# Patient Record
Sex: FEMALE | Race: BLACK OR AFRICAN AMERICAN | NOT HISPANIC OR LATINO | Employment: UNEMPLOYED | URBAN - METROPOLITAN AREA
[De-identification: names, ages, dates, MRNs, and addresses within clinical notes are randomized per-mention and may not be internally consistent; named-entity substitution may affect disease eponyms.]

---

## 2018-02-16 ENCOUNTER — HISTORICAL (OUTPATIENT)
Dept: ADMINISTRATIVE | Facility: HOSPITAL | Age: 53
End: 2018-02-16

## 2018-02-16 LAB
ABS NEUT (OLG): 3.64 X10(3)/MCL (ref 2.1–9.2)
ALBUMIN SERPL-MCNC: 3.5 GM/DL (ref 3.4–5)
ALBUMIN/GLOB SERPL: 1 RATIO (ref 1–2)
ALP SERPL-CCNC: 93 UNIT/L (ref 45–117)
ALT SERPL-CCNC: 17 UNIT/L (ref 12–78)
AST SERPL-CCNC: 11 UNIT/L (ref 15–37)
BASOPHILS # BLD AUTO: 0.02 X10(3)/MCL
BASOPHILS NFR BLD AUTO: 0 % (ref 0–1)
BILIRUB SERPL-MCNC: 0.3 MG/DL (ref 0.2–1)
BILIRUBIN DIRECT+TOT PNL SERPL-MCNC: 0.1 MG/DL
BILIRUBIN DIRECT+TOT PNL SERPL-MCNC: 0.2 MG/DL
BUN SERPL-MCNC: 12 MG/DL (ref 7–18)
CALCIUM SERPL-MCNC: 8.7 MG/DL (ref 8.5–10.1)
CHLORIDE SERPL-SCNC: 107 MMOL/L (ref 98–107)
CO2 SERPL-SCNC: 29 MMOL/L (ref 21–32)
CREAT SERPL-MCNC: 0.8 MG/DL (ref 0.6–1.3)
EOSINOPHIL # BLD AUTO: 0.19 X10(3)/MCL
EOSINOPHIL NFR BLD AUTO: 3 % (ref 0–5)
ERYTHROCYTE [DISTWIDTH] IN BLOOD BY AUTOMATED COUNT: 14.5 % (ref 11.5–14.5)
GLOBULIN SER-MCNC: 4.6 GM/ML (ref 2.3–3.5)
GLUCOSE SERPL-MCNC: 96 MG/DL (ref 74–106)
HCT VFR BLD AUTO: 40.9 % (ref 35–46)
HGB BLD-MCNC: 13.2 GM/DL (ref 12–16)
IMM GRANULOCYTES # BLD AUTO: 0.03 10*3/UL
IMM GRANULOCYTES NFR BLD AUTO: 0 %
LYMPHOCYTES # BLD AUTO: 2.9 X10(3)/MCL
LYMPHOCYTES NFR BLD AUTO: 39 % (ref 15–40)
MCH RBC QN AUTO: 25.8 PG (ref 26–34)
MCHC RBC AUTO-ENTMCNC: 32.3 GM/DL (ref 31–37)
MCV RBC AUTO: 79.9 FL (ref 80–100)
MONOCYTES # BLD AUTO: 0.58 X10(3)/MCL
MONOCYTES NFR BLD AUTO: 8 % (ref 4–12)
NEUTROPHILS # BLD AUTO: 3.64 X10(3)/MCL
NEUTROPHILS NFR BLD AUTO: 49 X10(3)/MCL
PLATELET # BLD AUTO: 315 X10(3)/MCL (ref 130–400)
PMV BLD AUTO: 9.8 FL (ref 7.4–10.4)
POTASSIUM SERPL-SCNC: 3.5 MMOL/L (ref 3.5–5.1)
PROT SERPL-MCNC: 8.1 GM/DL (ref 6.4–8.2)
RBC # BLD AUTO: 5.12 X10(6)/MCL (ref 4–5.2)
SODIUM SERPL-SCNC: 140 MMOL/L (ref 136–145)
WBC # SPEC AUTO: 7.4 X10(3)/MCL (ref 4.5–11)

## 2022-04-30 NOTE — PROGRESS NOTES
Patient:   Christie Jha             MRN: [id number]            FIN: [id number]               Age:   52 years     Sex:  Female     :  1965   Associated Diagnoses:   None   Author:   Celia Pedroza      Visit Information   Dx & staging      Chief Complaint         Problem List:  Rectal polyp 1.2 cm, with 0.87 m low-grade carcinoid, positive margin   - 2015, colonoscopy by Dr. Sesay   - No evidence of systemic disease by 5 HIAA on urine, chromogranin A, CT scans, octreotide scan with subtle questionable finding without verification  History of additional tubular adenomatous    Current Treatment:  Surveillance - colonoscopy reported 2016    Treatment History:  none     Clinical History/HPI:  This is a 50 yo AAM presents as f/u for rectal neuroendocrine tumor. She was being followed by Dr. Sesay in his clinic. She was initially seen by him in 08 complaining of rectal bleeding, abdominal pain, mucus in stool and change in stool caliber. She had initial colonoscopy on 2008 with unknown results. A repeat colonoscopy was done on 3/26/2013 showing single polyp in proximal descending colon and single polyp in rectum.  Biopsy showed tubular adenoma with no high grade dysplasia or malignancy of the descending colon polyp. A follow-up colonoscopy on 5/27/15 showed a rectal polyp (5 mm) with biopsy showing low grade neuroendocrine carcinoid tumor present at resection margin with largest microscopic dimension of 0.8 cm with involvement of submucosa and mucosa. Tumor is strongly positive for synaptophysin and negative for chromogranin.  Patient has family history of colon CA in her grandfather and lung CA in her mother.     5/27/15 cscope path report: Rectum polyp, low-grade neuroendocrine (carcinoid) tumor, present at resection margin, largest microscopic dimension 0.8 cm, mucosa/submucosa involvement, strongly positive for synaptophyin and neg for chromagrannin.    Chromogranin level  on 7/2/15 was  48.     On 7/13/15: CT A/P: IMPRESSION: Normal postcholecystectomy CT abdomen pelvis. Thoracolumbar scoliosis.    NM whole body scan on 7/21/15: IMPRESSION: 1. Subtle by liver findings-SPECT reconstruction artifact versus subtle lesion. 2. Consider MRI Abdomen with and without contrast to further assess.     MRI 7/31/15: IMPRESSION: Limited exam due to motion artifact. Otherwise, grossly unremarkable MR evaluation of the abdomen.     7/31/15 24-hr 5-HIAA: 4      Interval History     2/16/17: Obese young female with a history as above, found on colonoscopy screening to have a rectal polyp involved by low-grade carcinoid; I noted taht it had a positive margin.  I did obtain the original pathology report to review.  She has some chronic loose stools that dates back to cholecystectomy, and has no flushing, no unintentional weight loss, would like to lose weight, no chronic abdominal symptoms.  Although I do not have records, she has had a follow-up colonoscopy in April 2016; I'm unsure of results; there is plans for a one-year follow-up, presumably in the next few months.  2/16/18: Patient presents for scheduled follow-up. It's been 1 year since her last visit. She denies having any change in bowel habits, no constipation, no diarrhea. She's had no unintentional weight loss, no fever, chills, night sweats, no chronic abdominal pains, shortness of breath or cough. She does annual colonoscopies with Dr. Sesay in [place], last done was in April 2017. Will request these records. Patient reports there were no abnormal findings.      Review of Systems   A complete 12 point ROS was performed in full with pertinent positives as described in interval history. Remainder of ROS done in full and are negative.      Health Status   Allergies:    Allergic Reactions (Selected)  Mild  Iodine- Rash.  High  Amoxicillin- Anaphylaxis.,    Allergies (2) Active Reaction  amoxicillin Anaphylaxis  iodine rash     Current  medications:  (Selected)   Prescriptions  Prescribed  Vitamin D3 50,000 intl units oral capsule: 50,000 IntUnit = 1 cap(s), Oral, qWeek, # 9 cap(s), 0 Refill(s), Pharmacy: [facility]  Documented Medications  Documented  AMLODIPINE   TAB 5M mg = 1 tab(s), Oral, Daily  garlic oral capsule: 0 Refill(s)  hydrochlorothiazide 25 mg oral tablet: 25 mg = 1 tab(s), Oral, Daily, # 30 tab(s), 0 Refill(s)  lisinopril 20 mg oral tablet: 0 Refill(s)  omeprazole 20 mg oral DR capsule: 0 Refill(s)   Problem list:    All Problems  FH: Hypertension / SNOMED CT 776893202 / Confirmed  Morbid obesity / SNOMED CT 613391342 / Probable  Neuroendocrine carcinoma / SNOMED CT 255339257 / Confirmed,    Active Problems (3)  FH: Hypertension   Morbid obesity   Neuroendocrine carcinoma         Histories   Past Medical History:    No active or resolved past medical history items have been selected or recorded., HTN   Family History:    Primary malignant neoplasm of lung  Mother (Yocasta Kern, )  , mother diagnosed with lung CA at age 68, grandfather with colon CA    Procedure history:    FH: cholecystectomy ([id number]) on 2014 at 48 Years.  hysterectomy on 2010 at 44 Years.   Social History        Social & Psychosocial Habits    Alcohol  2015 Risk Assessment: Denies Alcohol Use    2016  Use: Never    Employment/School  2015 Risk Assessment: Not employed or in school    2016  Status: Unemployed    Home/Environment  2016  Lives with: Spouse    Substance Abuse  2015 Risk Assessment: Denies Substance Abuse    2016  Use: Never    Tobacco  2015 Risk Assessment: Denies Tobacco Use    2016  Use: Never smoker  .     denies tobacco use, occasional EtOH use .        Physical Examination   Vital Signs   2018 12:53 CST      Temperature Oral          36.6 DegC                             Temperature Oral (calculated)             97.88 DegF                              Peripheral Pulse Rate     63 bpm                             Respiratory Rate          18 br/min                             Systolic Blood Pressure   137 mmHg                             Diastolic Blood Pressure  88 mmHg     General:  Alert and oriented, No acute distress.    Eye:  Pupils are equal, round and reactive to light, Extraocular movements are intact.    HENT:  Normocephalic.    Neck:  Supple, Non-tender.    Respiratory:  Lungs are clear to auscultation, Respirations are non-labored, Breath sounds are equal, Symmetrical chest wall expansion.    Cardiovascular:  Normal rate, Regular rhythm, No murmur, No gallop, No edema.    Gastrointestinal:  Soft, Non-tender, Normal bowel sounds, No obvious organomegaly.         Abdomen: Obese.    Genitourinary:  No costovertebral angle tenderness.    Musculoskeletal:  Normal range of motion, No deformity, Normal gait.    Integumentary:  Warm, Dry, Intact.    Neurologic:  Alert, Oriented, Cranial Nerves II-XII are grossly intact.    Cognition and Speech:  Speech clear and coherent, Functional cognition intact.    Psychiatric:  Cooperative, Appropriate mood & affect, Normal judgment.    The [employer] Toxicity Scores:   Toxicity Grading   2/15/2018 12:47 CST      Phone Message/Call        Oncology clinic appointment    2/15/2018 8:45 CST       Phone Message/Call        General Message  (Modified)    ECOG Performance Scale: 1- Strenuous physical activity restricted; fully ambulatory and able to carry out light work.      Review / Management   Most recent CT scan from 9/6/2016 reviewed, with hepatomegaly, no focal lesions, status post cholecystectomy, no definite hepatic steatosis texture changes, no adenopathy or other findings      Impression and Plan     Impression:  Rectal polyp with carcinoid tumor of rectum- diagnosed on 5/27/15   - Technically T1 NX   - Positive margin   - Probably low risk of systemic disease   - Difficult  to define risk of local recurrence;  initial endoscopic reexcision of the area could have been considered at present time, although not likely feasible now  Hepatomegaly, significant obesity, LFTs normal  Chronic microcytosis with normal hemoglobin, elevated RBC, likely consistent with thalassemia trait (not discussed with patient, per Dr. Jim)    Discussion Per Dr. Jim 2/2017:  Role of medical oncology in patient's follow-up is unclear;  I believe is very important have continued colonoscopy or a flexible sigmoidoscopy to monitor and surveillance of the rectal for any local progression.    Plan  Continue GI follow-up with surveillance with attention to the rectum, per Dr. Sesay in [place].  Request GI notes and colonoscopy reports from Dr. Sesay.  General visit with oncology in one year, sooner if desired by physicians or patient  Consider increase activity, healthy lifestyle choices, goals of weight loss

## 2022-10-14 ENCOUNTER — OFFICE VISIT (OUTPATIENT)
Dept: ENDOCRINOLOGY | Facility: CLINIC | Age: 57
End: 2022-10-14
Payer: MEDICAID

## 2022-10-14 VITALS
SYSTOLIC BLOOD PRESSURE: 131 MMHG | HEIGHT: 63 IN | TEMPERATURE: 99 F | BODY MASS INDEX: 44.45 KG/M2 | HEART RATE: 53 BPM | DIASTOLIC BLOOD PRESSURE: 86 MMHG | WEIGHT: 250.88 LBS | RESPIRATION RATE: 20 BRPM

## 2022-10-14 DIAGNOSIS — E11.9 TYPE 2 DIABETES MELLITUS WITHOUT COMPLICATION, WITHOUT LONG-TERM CURRENT USE OF INSULIN: Primary | ICD-10-CM

## 2022-10-14 DIAGNOSIS — D3A.8 NEUROENDOCRINE TUMOR OF ANUS: ICD-10-CM

## 2022-10-14 PROBLEM — M19.90 DJD (DEGENERATIVE JOINT DISEASE): Status: ACTIVE | Noted: 2019-09-17

## 2022-10-14 PROBLEM — M25.519 SHOULDER PAIN: Status: ACTIVE | Noted: 2019-09-17

## 2022-10-14 PROBLEM — M41.9 SCOLIOSIS: Status: ACTIVE | Noted: 2019-09-17

## 2022-10-14 PROBLEM — E66.9 OBESITY: Status: ACTIVE | Noted: 2019-09-17

## 2022-10-14 PROBLEM — N20.0 KIDNEY CALCULUS: Status: ACTIVE | Noted: 2019-09-17

## 2022-10-14 PROBLEM — I10 HTN (HYPERTENSION): Status: ACTIVE | Noted: 2019-09-17

## 2022-10-14 PROBLEM — N62 MACROMASTIA: Status: ACTIVE | Noted: 2019-09-19

## 2022-10-14 PROBLEM — J32.9 SINUSITIS: Status: ACTIVE | Noted: 2019-09-17

## 2022-10-14 PROBLEM — C7A.8 NEUROENDOCRINE CARCINOMA: Status: ACTIVE | Noted: 2022-10-14

## 2022-10-14 PROBLEM — K21.9 GERD (GASTROESOPHAGEAL REFLUX DISEASE): Status: ACTIVE | Noted: 2019-09-17

## 2022-10-14 PROBLEM — M75.100 ROTATOR CUFF TEAR: Status: ACTIVE | Noted: 2019-09-17

## 2022-10-14 PROBLEM — M77.30 CALCANEAL SPUR: Status: ACTIVE | Noted: 2019-09-17

## 2022-10-14 LAB
ALBUMIN SERPL-MCNC: 4.1 GM/DL (ref 3.5–5)
ALBUMIN/GLOB SERPL: 1.1 RATIO (ref 1.1–2)
ALP SERPL-CCNC: 90 UNIT/L (ref 40–150)
ALT SERPL-CCNC: 11 UNIT/L (ref 0–55)
AST SERPL-CCNC: 11 UNIT/L (ref 5–34)
BILIRUBIN DIRECT+TOT PNL SERPL-MCNC: 0.4 MG/DL
BUN SERPL-MCNC: 12.5 MG/DL (ref 9.8–20.1)
CALCIUM SERPL-MCNC: 9.6 MG/DL (ref 8.4–10.2)
CHLORIDE SERPL-SCNC: 103 MMOL/L (ref 98–107)
CHOLEST SERPL-MCNC: 205 MG/DL
CHOLEST/HDLC SERPL: 4 {RATIO} (ref 0–5)
CO2 SERPL-SCNC: 28 MMOL/L (ref 22–29)
CREAT SERPL-MCNC: 0.74 MG/DL (ref 0.55–1.02)
CREAT UR-MCNC: 87.6 MG/DL (ref 47–110)
GFR SERPLBLD CREATININE-BSD FMLA CKD-EPI: >60 MLS/MIN/1.73/M2
GLOBULIN SER-MCNC: 3.9 GM/DL (ref 2.4–3.5)
GLUCOSE SERPL-MCNC: 83 MG/DL (ref 74–100)
HBA1C MFR BLD: 6 %
HDLC SERPL-MCNC: 58 MG/DL (ref 35–60)
LDLC SERPL CALC-MCNC: 131 MG/DL (ref 50–140)
MICROALBUMIN UR-MCNC: 10 UG/ML
MICROALBUMIN/CREAT RATIO PNL UR: 11.4 MG/GM CR (ref 0–30)
POTASSIUM SERPL-SCNC: 3.7 MMOL/L (ref 3.5–5.1)
PROT SERPL-MCNC: 8 GM/DL (ref 6.4–8.3)
SODIUM SERPL-SCNC: 141 MMOL/L (ref 136–145)
TRIGL SERPL-MCNC: 80 MG/DL (ref 37–140)
VLDLC SERPL CALC-MCNC: 16 MG/DL

## 2022-10-14 PROCEDURE — 3079F DIAST BP 80-89 MM HG: CPT | Mod: CPTII,,, | Performed by: NURSE PRACTITIONER

## 2022-10-14 PROCEDURE — 36415 COLL VENOUS BLD VENIPUNCTURE: CPT | Performed by: NURSE PRACTITIONER

## 2022-10-14 PROCEDURE — 82043 UR ALBUMIN QUANTITATIVE: CPT | Performed by: NURSE PRACTITIONER

## 2022-10-14 PROCEDURE — 1159F MED LIST DOCD IN RCRD: CPT | Mod: CPTII,,, | Performed by: NURSE PRACTITIONER

## 2022-10-14 PROCEDURE — 80061 LIPID PANEL: CPT | Performed by: NURSE PRACTITIONER

## 2022-10-14 PROCEDURE — 99214 OFFICE O/P EST MOD 30 MIN: CPT | Mod: PBBFAC | Performed by: NURSE PRACTITIONER

## 2022-10-14 PROCEDURE — 1159F PR MEDICATION LIST DOCUMENTED IN MEDICAL RECORD: ICD-10-PCS | Mod: CPTII,,, | Performed by: NURSE PRACTITIONER

## 2022-10-14 PROCEDURE — 83036 HEMOGLOBIN GLYCOSYLATED A1C: CPT | Mod: PBBFAC | Performed by: NURSE PRACTITIONER

## 2022-10-14 PROCEDURE — 99204 OFFICE O/P NEW MOD 45 MIN: CPT | Mod: S$PBB,,, | Performed by: NURSE PRACTITIONER

## 2022-10-14 PROCEDURE — 84681 ASSAY OF C-PEPTIDE: CPT | Performed by: NURSE PRACTITIONER

## 2022-10-14 PROCEDURE — 1160F PR REVIEW ALL MEDS BY PRESCRIBER/CLIN PHARMACIST DOCUMENTED: ICD-10-PCS | Mod: CPTII,,, | Performed by: NURSE PRACTITIONER

## 2022-10-14 PROCEDURE — 3044F HG A1C LEVEL LT 7.0%: CPT | Mod: CPTII,,, | Performed by: NURSE PRACTITIONER

## 2022-10-14 PROCEDURE — 3075F PR MOST RECENT SYSTOLIC BLOOD PRESS GE 130-139MM HG: ICD-10-PCS | Mod: CPTII,,, | Performed by: NURSE PRACTITIONER

## 2022-10-14 PROCEDURE — 3044F PR MOST RECENT HEMOGLOBIN A1C LEVEL <7.0%: ICD-10-PCS | Mod: CPTII,,, | Performed by: NURSE PRACTITIONER

## 2022-10-14 PROCEDURE — 80053 COMPREHEN METABOLIC PANEL: CPT | Performed by: NURSE PRACTITIONER

## 2022-10-14 PROCEDURE — 99204 PR OFFICE/OUTPT VISIT, NEW, LEVL IV, 45-59 MIN: ICD-10-PCS | Mod: S$PBB,,, | Performed by: NURSE PRACTITIONER

## 2022-10-14 PROCEDURE — 3075F SYST BP GE 130 - 139MM HG: CPT | Mod: CPTII,,, | Performed by: NURSE PRACTITIONER

## 2022-10-14 PROCEDURE — 3079F PR MOST RECENT DIASTOLIC BLOOD PRESSURE 80-89 MM HG: ICD-10-PCS | Mod: CPTII,,, | Performed by: NURSE PRACTITIONER

## 2022-10-14 PROCEDURE — 1160F RVW MEDS BY RX/DR IN RCRD: CPT | Mod: CPTII,,, | Performed by: NURSE PRACTITIONER

## 2022-10-14 RX ORDER — ORLISTAT 120 MG
120 CAPSULE ORAL 3 TIMES DAILY
COMMUNITY
Start: 2022-08-23

## 2022-10-14 RX ORDER — INDOMETHACIN 50 MG/1
50 CAPSULE ORAL 2 TIMES DAILY WITH MEALS
COMMUNITY

## 2022-10-14 RX ORDER — LANCETS 33 GAUGE
EACH MISCELLANEOUS DAILY
COMMUNITY
Start: 2022-08-18

## 2022-10-14 RX ORDER — AMLODIPINE BESYLATE 5 MG/1
5 TABLET ORAL DAILY
COMMUNITY
Start: 2022-10-06

## 2022-10-14 RX ORDER — PEN NEEDLE, DIABETIC 31 GX5/16"
NEEDLE, DISPOSABLE MISCELLANEOUS DAILY
COMMUNITY
Start: 2022-09-04 | End: 2023-07-25 | Stop reason: SDUPTHER

## 2022-10-14 RX ORDER — CALCIUM CITRATE/VITAMIN D3 200MG-6.25
TABLET ORAL
COMMUNITY
Start: 2022-09-29

## 2022-10-14 RX ORDER — PANTOPRAZOLE SODIUM 40 MG/1
40 TABLET, DELAYED RELEASE ORAL DAILY
COMMUNITY
Start: 2022-10-10

## 2022-10-14 RX ORDER — METFORMIN HYDROCHLORIDE 500 MG/1
1000 TABLET ORAL 2 TIMES DAILY
COMMUNITY
Start: 2022-08-18 | End: 2023-10-17

## 2022-10-14 RX ORDER — PHENTERMINE HYDROCHLORIDE 37.5 MG/1
37.5 TABLET ORAL EVERY MORNING
COMMUNITY
Start: 2022-10-05

## 2022-10-14 NOTE — PROGRESS NOTES
Subjective:       Patient ID: Christie Jha is a 56 y.o. female.    Chief Complaint: Diabetes (New patient referral )    Endocrine clinic note 10/14/2022:  56-year-old female scheduled today for endocrine clinic as new patient referral for history of type 2 diabetes neuro endocrine tumor of the rectum.  Type 2 diabetes on patient's referral A1c was 13.9 patient reports the clinic today A1c improved to 6.0 from previous 13.9.  Patient states she is only on metformin a 1000 mg b.i.d. 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  Patient denies any hypoglycemia.  Patient has history of low-grade neuroendocrine tumor of the rectum diagnosed on 05/27/2015 patient was seen at [facility].  Patient had colonoscopy 05/27/2015 which showed a rectal polyp 5 mm with biopsy showing low-grade neuroendocrine carcinoid tumor present the resection margin with largest microscopic dimension of 0.8 cm with involvement of submucosal and mucosa.  Tumor was strongly positive for synaptophysin and negative for chromogranin.  In 2015 oncology clinic ordered CT of the abdomen and pelvis, and whole-body scan and MRI of the abdomen all within normal acceptable limits.  Patient denies any flushing diarrhea nausea.  She states she was discharged at that time.    Review of Systems   Constitutional:  Negative for activity change, appetite change and fatigue.   HENT:  Negative for dental problem, hearing loss, tinnitus, trouble swallowing and goiter.    Eyes:  Negative for photophobia, pain and visual disturbance.   Respiratory:  Negative for cough, chest tightness and wheezing.    Cardiovascular:  Negative for chest pain, palpitations and leg swelling.   Gastrointestinal:  Negative for abdominal pain, constipation, diarrhea, nausea and reflux.   Endocrine: Negative for cold intolerance, heat intolerance, polydipsia and  polyphagia.   Genitourinary:  Negative for difficulty urinating, flank pain, hematuria, hot flashes, menstrual irregularity, menstrual problem, nocturia and urgency.   Musculoskeletal:  Negative for back pain, gait problem, joint swelling, leg pain and joint deformity.   Integumentary:  Negative for color change, pallor, rash and breast discharge.   Allergic/Immunologic: Negative for environmental allergies, food allergies and immunocompromised state.   Neurological:  Negative for tremors, seizures, headaches, coordination difficulties, memory loss and coordination difficulties.   Psychiatric/Behavioral:  Negative for agitation, behavioral problems and sleep disturbance. The patient is not nervous/anxious.        Objective:      Physical Exam  Constitutional:       General: She is not in acute distress.     Appearance: Normal appearance. She is not ill-appearing.   HENT:      Head: Normocephalic and atraumatic.      Right Ear: External ear normal.      Left Ear: External ear normal.      Nose: Nose normal. No congestion or rhinorrhea.      Mouth/Throat:      Mouth: Mucous membranes are moist.      Pharynx: Oropharynx is clear. No oropharyngeal exudate.   Eyes:      General:         Right eye: No discharge.         Left eye: No discharge.      Conjunctiva/sclera: Conjunctivae normal.      Pupils: Pupils are equal, round, and reactive to light.   Neck:      Thyroid: No thyroid mass, thyromegaly or thyroid tenderness.   Cardiovascular:      Rate and Rhythm: Normal rate and regular rhythm.      Pulses:           Dorsalis pedis pulses are 3+ on the right side and 3+ on the left side.        Posterior tibial pulses are 3+ on the right side and 3+ on the left side.      Heart sounds: Normal heart sounds. No murmur heard.  Pulmonary:      Effort: Pulmonary effort is normal. No respiratory distress.      Breath sounds: Normal breath sounds.   Abdominal:      General: Abdomen is flat. Bowel sounds are normal. There is no  distension.      Palpations: Abdomen is soft.      Tenderness: There is no abdominal tenderness.   Musculoskeletal:         General: No swelling or tenderness. Normal range of motion.      Cervical back: Normal range of motion and neck supple. No tenderness.      Right lower leg: No edema.      Left lower leg: No edema.   Feet:      Right foot:      Protective Sensation: 5 sites tested.  5 sites sensed.      Skin integrity: Skin integrity normal.      Toenail Condition: Right toenails are normal.      Left foot:      Protective Sensation: 5 sites tested.  5 sites sensed.      Skin integrity: Skin integrity normal.      Toenail Condition: Left toenails are normal.   Lymphadenopathy:      Cervical: No cervical adenopathy.   Skin:     General: Skin is warm and dry.      Coloration: Skin is not jaundiced or pale.   Neurological:      General: No focal deficit present.      Mental Status: She is alert and oriented to person, place, and time. Mental status is at baseline.      Coordination: Coordination normal.      Gait: Gait normal.   Psychiatric:         Mood and Affect: Mood normal.         Behavior: Behavior normal.         Thought Content: Thought content normal.       Assessment:       Problem List Items Addressed This Visit    None  Visit Diagnoses       Type 2 diabetes mellitus without complication, without long-term current use of insulin    -  Primary    Relevant Medications    metFORMIN (GLUCOPHAGE) 500 MG tablet    Other Relevant Orders    Vitamin D    C-Peptide    Hemoglobin A1C, POCT (Completed)    Microalbumin/Creatinine Ratio, Urine (Completed)    Comprehensive Metabolic Panel (Completed)    Lipid Panel (Completed)    Neuroendocrine tumor of anus                Plan:       Type 2 diabetes mellitus without complication, without long-term current use of insulin  Current A1C 6.0 at goal   Medications: Metformin 1000 mg BID: No changes A1c at goal  A1C goal <7.0   Follow ADA diet, avoid soda, simple sweets,  and limit rice, breads and starches  Do not skip meals, eat balanced ADA approved meals   Maintain a healthy weight, exercise 4-5 times a week for 30 minutes  Diet and medication adherence discussed on visit  Call Clinic to report Hypoglycemia (CBG's <90)  -     Vitamin D; Future; Expected date: 10/14/2022  -     C-Peptide; Future; Expected date: 10/14/2022  -     Hemoglobin A1C, POCT  -     Microalbumin/Creatinine Ratio, Urine  -     Comprehensive Metabolic Panel; Future; Expected date: 10/14/2022  -     Lipid Panel; Future; Expected date: 10/14/2022    Neuroendocrine tumor of anus  Previous notes review viewed from [facility] in 2015  Previous CT of the abdomen and pelvis, MRI of the abdomen and whole-body scan reviewed  Will continue to monitor patient for symptoms      I spent a total of 45 minutes on the day of the visit.  This includes face to face time and non-face to face time preparing to see the patient (eg, review of tests), obtaining and/or reviewing separately obtained history, documenting clinical information in the electronic or other health record, independently interpreting results and communicating results to the patient/family/caregiver, or care coordinator.

## 2022-10-19 LAB — C PEPTIDE P FAST SERPL-MCNC: 2.4 NG/ML (ref 1.1–4.4)

## 2023-04-17 ENCOUNTER — OFFICE VISIT (OUTPATIENT)
Dept: ENDOCRINOLOGY | Facility: CLINIC | Age: 58
End: 2023-04-17
Payer: MEDICAID

## 2023-04-17 VITALS
WEIGHT: 264.31 LBS | DIASTOLIC BLOOD PRESSURE: 83 MMHG | BODY MASS INDEX: 46.83 KG/M2 | TEMPERATURE: 99 F | HEART RATE: 65 BPM | HEIGHT: 63 IN | RESPIRATION RATE: 20 BRPM | SYSTOLIC BLOOD PRESSURE: 139 MMHG

## 2023-04-17 DIAGNOSIS — D3A.8 NEUROENDOCRINE TUMOR OF ANUS: ICD-10-CM

## 2023-04-17 DIAGNOSIS — E11.9 TYPE 2 DIABETES MELLITUS WITHOUT COMPLICATION, WITHOUT LONG-TERM CURRENT USE OF INSULIN: Primary | ICD-10-CM

## 2023-04-17 LAB
CREAT UR-MCNC: 114.5 MG/DL (ref 47–110)
MICROALBUMIN UR-MCNC: 40.4 UG/ML
MICROALBUMIN/CREAT RATIO PNL UR: 35.3 MG/GM CR (ref 0–30)

## 2023-04-17 PROCEDURE — 82043 UR ALBUMIN QUANTITATIVE: CPT | Performed by: NURSE PRACTITIONER

## 2023-04-17 PROCEDURE — 99214 OFFICE O/P EST MOD 30 MIN: CPT | Mod: S$PBB,,, | Performed by: NURSE PRACTITIONER

## 2023-04-17 PROCEDURE — 3008F PR BODY MASS INDEX (BMI) DOCUMENTED: ICD-10-PCS | Mod: CPTII,,, | Performed by: NURSE PRACTITIONER

## 2023-04-17 PROCEDURE — 1159F PR MEDICATION LIST DOCUMENTED IN MEDICAL RECORD: ICD-10-PCS | Mod: CPTII,,, | Performed by: NURSE PRACTITIONER

## 2023-04-17 PROCEDURE — 3008F BODY MASS INDEX DOCD: CPT | Mod: CPTII,,, | Performed by: NURSE PRACTITIONER

## 2023-04-17 PROCEDURE — 99214 PR OFFICE/OUTPT VISIT, EST, LEVL IV, 30-39 MIN: ICD-10-PCS | Mod: S$PBB,,, | Performed by: NURSE PRACTITIONER

## 2023-04-17 PROCEDURE — 3075F SYST BP GE 130 - 139MM HG: CPT | Mod: CPTII,,, | Performed by: NURSE PRACTITIONER

## 2023-04-17 PROCEDURE — 3075F PR MOST RECENT SYSTOLIC BLOOD PRESS GE 130-139MM HG: ICD-10-PCS | Mod: CPTII,,, | Performed by: NURSE PRACTITIONER

## 2023-04-17 PROCEDURE — 1160F RVW MEDS BY RX/DR IN RCRD: CPT | Mod: CPTII,,, | Performed by: NURSE PRACTITIONER

## 2023-04-17 PROCEDURE — 83036 HEMOGLOBIN GLYCOSYLATED A1C: CPT | Mod: PBBFAC | Performed by: NURSE PRACTITIONER

## 2023-04-17 PROCEDURE — 1160F PR REVIEW ALL MEDS BY PRESCRIBER/CLIN PHARMACIST DOCUMENTED: ICD-10-PCS | Mod: CPTII,,, | Performed by: NURSE PRACTITIONER

## 2023-04-17 PROCEDURE — 1159F MED LIST DOCD IN RCRD: CPT | Mod: CPTII,,, | Performed by: NURSE PRACTITIONER

## 2023-04-17 PROCEDURE — 3079F DIAST BP 80-89 MM HG: CPT | Mod: CPTII,,, | Performed by: NURSE PRACTITIONER

## 2023-04-17 PROCEDURE — 99214 OFFICE O/P EST MOD 30 MIN: CPT | Mod: PBBFAC | Performed by: NURSE PRACTITIONER

## 2023-04-17 PROCEDURE — 3079F PR MOST RECENT DIASTOLIC BLOOD PRESSURE 80-89 MM HG: ICD-10-PCS | Mod: CPTII,,, | Performed by: NURSE PRACTITIONER

## 2023-04-17 RX ORDER — HYDROCHLOROTHIAZIDE 25 MG/1
TABLET ORAL
COMMUNITY

## 2023-04-17 RX ORDER — MONTELUKAST SODIUM 10 MG/1
10 TABLET ORAL
COMMUNITY
Start: 2023-03-02

## 2023-04-17 RX ORDER — CETIRIZINE HYDROCHLORIDE 10 MG/1
TABLET ORAL
COMMUNITY

## 2023-04-17 RX ORDER — ASPIRIN 81 MG/1
TABLET ORAL
COMMUNITY

## 2023-04-17 RX ORDER — SEMAGLUTIDE 1.34 MG/ML
INJECTION, SOLUTION SUBCUTANEOUS
Qty: 3 ML | Refills: 11 | Status: SHIPPED | OUTPATIENT
Start: 2023-04-17 | End: 2023-10-17 | Stop reason: SDUPTHER

## 2023-04-17 NOTE — PROGRESS NOTES
Subjective     Patient ID: Christie Jha is a 57 y.o. female.    Chief Complaint: Diabetes (Follow up )    Endocrine clinic note 10/14/2022:  56-year-old female scheduled today for endocrine clinic as new patient referral for history of type 2 diabetes neuro endocrine tumor of the rectum.  Type 2 diabetes on patient's referral A1c was 13.9 patient reports the clinic today A1c improved to 6.0 from previous 13.9.  Patient states she is only on metformin a 1000 mg b.i.d. 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  Patient denies any hypoglycemia.  Patient has history of low-grade neuroendocrine tumor of the rectum diagnosed on 05/27/2015 patient was seen at [facility].  Patient had colonoscopy 05/27/2015 which showed a rectal polyp 5 mm with biopsy showing low-grade neuroendocrine carcinoid tumor present the resection margin with largest microscopic dimension of 0.8 cm with involvement of submucosal and mucosa.  Tumor was strongly positive for synaptophysin and negative for chromogranin.  In 2015 oncology clinic ordered CT of the abdomen and pelvis, and whole-body scan and MRI of the abdomen all within normal acceptable limits.  Patient denies any flushing diarrhea nausea.  She states she was discharged at that time.    Endocrine Clinic note 04/17/2023:  57-year-old female scheduled today for endocrine clinic follow-up.  History of type 2 diabetes and neuro endocrine tumor of the rectum.  Type 2 diabetes current A1c increased 7.0 from previous 6.0.  Patient states she is been eating and snacking more and her CBGS have increased at home and having multiple numbers in the 200s at times.  Patient states she is been eating more sweets and also starches.  Patient denies any hypoglycemia. History of low-grade neuroendocrine tumor of the rectum diagnosed on 05/27/2015 patient was seen at [facility].  Patient had colonoscopy 05/27/2015 which showed a rectal polyp 5 mm with biopsy showing low-grade neuroendocrine carcinoid tumor present the resection margin with largest microscopic dimension of 0.8 cm with involvement of submucosal and mucosa.  Tumor was strongly positive for synaptophysin and negative for chromogranin.  In 2015 oncology clinic ordered CT of the abdomen and pelvis, and whole-body scan and MRI of the abdomen all within normal acceptable limits.  Patient denies any flushing, diarrhea, nausea.  She states currently she is due for a 5 year colonoscopy and will be calling her gastroenterologists    Review of Systems   Constitutional:  Negative for activity change, appetite change and fatigue.   HENT:  Negative for dental problem, hearing loss, tinnitus, trouble swallowing and goiter.    Eyes:  Negative for photophobia, pain and visual disturbance.   Respiratory:  Negative for cough, chest tightness and wheezing.    Cardiovascular:  Negative for chest pain, palpitations and leg swelling.   Gastrointestinal:  Negative for abdominal pain, constipation, diarrhea, nausea and reflux.   Endocrine: Negative for cold intolerance, heat intolerance, polydipsia and polyphagia.   Genitourinary:  Negative for difficulty urinating, flank pain, hematuria, hot flashes, menstrual irregularity, menstrual problem, nocturia and urgency.   Musculoskeletal:  Negative for back pain, gait problem, joint swelling, leg pain and joint deformity.   Integumentary:  Negative for color change, pallor, rash and breast discharge.   Allergic/Immunologic: Negative for environmental allergies, food allergies and immunocompromised state.   Neurological:  Negative for tremors, seizures, headaches, coordination difficulties, memory loss and coordination difficulties.   Psychiatric/Behavioral:  Negative for agitation, behavioral problems and sleep disturbance. The patient is not nervous/anxious.         Objective     Physical  Exam  Constitutional:       General: She is not in acute distress.     Appearance: Normal appearance. She is not ill-appearing.   HENT:      Head: Normocephalic and atraumatic.      Right Ear: External ear normal.      Left Ear: External ear normal.      Nose: Nose normal. No congestion or rhinorrhea.      Mouth/Throat:      Mouth: Mucous membranes are moist.      Pharynx: Oropharynx is clear. No oropharyngeal exudate.   Eyes:      General:         Right eye: No discharge.         Left eye: No discharge.      Conjunctiva/sclera: Conjunctivae normal.      Pupils: Pupils are equal, round, and reactive to light.   Neck:      Thyroid: No thyroid mass, thyromegaly or thyroid tenderness.   Cardiovascular:      Rate and Rhythm: Normal rate and regular rhythm.      Pulses: Normal pulses.      Heart sounds: Normal heart sounds. No murmur heard.  Pulmonary:      Effort: Pulmonary effort is normal. No respiratory distress.      Breath sounds: Normal breath sounds.   Abdominal:      General: Abdomen is flat. Bowel sounds are normal. There is no distension.      Palpations: Abdomen is soft.      Tenderness: There is no abdominal tenderness.   Musculoskeletal:         General: No swelling or tenderness. Normal range of motion.      Cervical back: Normal range of motion and neck supple. No tenderness.      Right lower leg: No edema.      Left lower leg: No edema.   Feet:      Right foot:      Skin integrity: Skin integrity normal.      Left foot:      Skin integrity: Skin integrity normal.   Lymphadenopathy:      Cervical: No cervical adenopathy.   Skin:     General: Skin is warm and dry.      Coloration: Skin is not jaundiced or pale.   Neurological:      General: No focal deficit present.      Mental Status: She is alert and oriented to person, place, and time. Mental status is at baseline.      Coordination: Coordination normal.      Gait: Gait normal.   Psychiatric:         Mood and Affect: Mood normal.         Behavior:  Behavior normal.         Thought Content: Thought content normal.          Assessment and Plan     Problem List Items Addressed This Visit    None  Visit Diagnoses       Type 2 diabetes mellitus without complication, without long-term current use of insulin    -  Primary    Relevant Orders    Hemoglobin A1C, POCT    Microalbumin/Creatinine Ratio, Urine    Neuroendocrine tumor of anus              Type 2 diabetes mellitus without complication, without long-term current use of insulin  Current A1C 7.0   Medications: Metformin 500 mg once a day Changes: Start Ozempic 0.25 mg increase to 0.5 mg weekly after 4 weeks   A1C goal <7.0   Follow ADA diet, avoid soda, simple sweets, and limit rice, breads and starches  Do not skip meals, eat balanced ADA approved meals   Maintain a healthy weight, exercise 4-5 times a week for 30 minutes  Diet and medication adherence discussed on visit  Call Clinic to report Hypoglycemia (CBG's <90)  RTC 4 months   -     Hemoglobin A1C, POCT  -     Microalbumin/Creatinine Ratio, Urine        -     semaglutide (OZEMPIC) 0.25 mg or 0.5 mg(2 mg/1.5 mL) pen injector; Start 0.25    mg x 4 weeks then increase to 0.5 mg weekly after  Dispense: 3 mL; Refill: 11    Neuroendocrine tumor of anus  Previous notes review viewed from [facility] in 2015  Previous CT of the abdomen and pelvis, MRI of the abdomen and whole-body scan reviewed  Will continue to monitor patient for symptoms        I spent a total of 30 minutes on the day of the visit.  This includes face to face time and non-face to face time preparing to see the patient (eg, review of tests), obtaining and/or reviewing separately obtained history, documenting clinical information in the electronic or other health record, independently interpreting results and communicating results to the patient/family/caregiver, or care coordinator.

## 2023-04-18 LAB — HBA1C MFR BLD: 7 %

## 2023-07-25 RX ORDER — PEN NEEDLE, DIABETIC 31 GX5/16"
NEEDLE, DISPOSABLE MISCELLANEOUS
Qty: 30 EACH | Refills: 11 | Status: SHIPPED | OUTPATIENT
Start: 2023-07-25

## 2023-07-25 NOTE — TELEPHONE ENCOUNTER
----- Message from Elin Ochoa sent at 7/25/2023 12:09 PM CDT -----  Regarding: Supply mgmt  SHIRA PT       Pt is requesting additional needles for the OzempicArya Dhaliwal on [address]   Pt # [phone]

## 2023-07-25 NOTE — TELEPHONE ENCOUNTER
Last visit in [facility] ENDOCRINOLOGY: 4/17/2023    Patient's next visit in [facility] ENDOCRINOLOGY: 10/17/2023

## 2023-10-17 ENCOUNTER — OFFICE VISIT (OUTPATIENT)
Dept: ENDOCRINOLOGY | Facility: CLINIC | Age: 58
End: 2023-10-17
Payer: MEDICAID

## 2023-10-17 VITALS
RESPIRATION RATE: 18 BRPM | WEIGHT: 260.81 LBS | HEART RATE: 72 BPM | BODY MASS INDEX: 46.21 KG/M2 | DIASTOLIC BLOOD PRESSURE: 83 MMHG | HEIGHT: 63 IN | SYSTOLIC BLOOD PRESSURE: 129 MMHG | TEMPERATURE: 99 F

## 2023-10-17 DIAGNOSIS — E11.9 TYPE 2 DIABETES MELLITUS WITHOUT COMPLICATION, WITHOUT LONG-TERM CURRENT USE OF INSULIN: Primary | ICD-10-CM

## 2023-10-17 LAB
CREAT UR-MCNC: 201.2 MG/DL (ref 45–106)
HBA1C MFR BLD: 6.9 %
MICROALBUMIN UR-MCNC: 23.6 UG/ML
MICROALBUMIN/CREAT RATIO PNL UR: 11.7 MG/GM CR (ref 0–30)

## 2023-10-17 PROCEDURE — 3008F PR BODY MASS INDEX (BMI) DOCUMENTED: ICD-10-PCS | Mod: CPTII,,, | Performed by: NURSE PRACTITIONER

## 2023-10-17 PROCEDURE — 1160F RVW MEDS BY RX/DR IN RCRD: CPT | Mod: CPTII,,, | Performed by: NURSE PRACTITIONER

## 2023-10-17 PROCEDURE — 3066F PR DOCUMENTATION OF TREATMENT FOR NEPHROPATHY: ICD-10-PCS | Mod: CPTII,,, | Performed by: NURSE PRACTITIONER

## 2023-10-17 PROCEDURE — 1160F PR REVIEW ALL MEDS BY PRESCRIBER/CLIN PHARMACIST DOCUMENTED: ICD-10-PCS | Mod: CPTII,,, | Performed by: NURSE PRACTITIONER

## 2023-10-17 PROCEDURE — 99214 OFFICE O/P EST MOD 30 MIN: CPT | Mod: PBBFAC | Performed by: NURSE PRACTITIONER

## 2023-10-17 PROCEDURE — 3079F PR MOST RECENT DIASTOLIC BLOOD PRESSURE 80-89 MM HG: ICD-10-PCS | Mod: CPTII,,, | Performed by: NURSE PRACTITIONER

## 2023-10-17 PROCEDURE — 3060F POS MICROALBUMINURIA REV: CPT | Mod: CPTII,,, | Performed by: NURSE PRACTITIONER

## 2023-10-17 PROCEDURE — 1159F PR MEDICATION LIST DOCUMENTED IN MEDICAL RECORD: ICD-10-PCS | Mod: CPTII,,, | Performed by: NURSE PRACTITIONER

## 2023-10-17 PROCEDURE — 3074F PR MOST RECENT SYSTOLIC BLOOD PRESSURE < 130 MM HG: ICD-10-PCS | Mod: CPTII,,, | Performed by: NURSE PRACTITIONER

## 2023-10-17 PROCEDURE — 1159F MED LIST DOCD IN RCRD: CPT | Mod: CPTII,,, | Performed by: NURSE PRACTITIONER

## 2023-10-17 PROCEDURE — 82043 UR ALBUMIN QUANTITATIVE: CPT | Performed by: NURSE PRACTITIONER

## 2023-10-17 PROCEDURE — 3066F NEPHROPATHY DOC TX: CPT | Mod: CPTII,,, | Performed by: NURSE PRACTITIONER

## 2023-10-17 PROCEDURE — 3074F SYST BP LT 130 MM HG: CPT | Mod: CPTII,,, | Performed by: NURSE PRACTITIONER

## 2023-10-17 PROCEDURE — 3008F BODY MASS INDEX DOCD: CPT | Mod: CPTII,,, | Performed by: NURSE PRACTITIONER

## 2023-10-17 PROCEDURE — 99214 OFFICE O/P EST MOD 30 MIN: CPT | Mod: S$PBB,,, | Performed by: NURSE PRACTITIONER

## 2023-10-17 PROCEDURE — 3044F HG A1C LEVEL LT 7.0%: CPT | Mod: CPTII,,, | Performed by: NURSE PRACTITIONER

## 2023-10-17 PROCEDURE — 3079F DIAST BP 80-89 MM HG: CPT | Mod: CPTII,,, | Performed by: NURSE PRACTITIONER

## 2023-10-17 PROCEDURE — 3044F PR MOST RECENT HEMOGLOBIN A1C LEVEL <7.0%: ICD-10-PCS | Mod: CPTII,,, | Performed by: NURSE PRACTITIONER

## 2023-10-17 PROCEDURE — 83036 HEMOGLOBIN GLYCOSYLATED A1C: CPT | Mod: PBBFAC | Performed by: NURSE PRACTITIONER

## 2023-10-17 PROCEDURE — 99214 PR OFFICE/OUTPT VISIT, EST, LEVL IV, 30-39 MIN: ICD-10-PCS | Mod: S$PBB,,, | Performed by: NURSE PRACTITIONER

## 2023-10-17 PROCEDURE — 3060F PR POS MICROALBUMINURIA RESULT DOCUMENTED/REVIEW: ICD-10-PCS | Mod: CPTII,,, | Performed by: NURSE PRACTITIONER

## 2023-10-17 RX ORDER — SEMAGLUTIDE 1.34 MG/ML
INJECTION, SOLUTION SUBCUTANEOUS
Qty: 3 ML | Refills: 11 | Status: SHIPPED | OUTPATIENT
Start: 2023-10-17

## 2023-10-17 RX ORDER — LISINOPRIL 2.5 MG/1
2.5 TABLET ORAL DAILY
Qty: 90 TABLET | Refills: 3 | Status: SHIPPED | OUTPATIENT
Start: 2023-10-17 | End: 2024-10-16

## 2023-10-17 NOTE — PROGRESS NOTES
Subjective     Patient ID: Christie Jha is a 57 y.o. female.    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  Type 2 diabetes on patient's referral A1c was 13.9 patient reports the clinic today A1c improved to 6.0 from previous 13.9.  Patient states she is only on metformin a 1000 mg b.i.d. 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  Patient denies any hypoglycemia.  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.  Patient had colonoscopy 05/27/2015 which showed a rectal polyp 5 mm with biopsy showing low-grade neuroendocrine carcinoid tumor present the resection margin with largest microscopic dimension of 0.8 cm with involvement of submucosal and mucosa.  Tumor was strongly positive for synaptophysin and negative for chromogranin.  In 2015 oncology clinic ordered CT of the abdomen and pelvis, and whole-body scan and MRI of the abdomen all within normal acceptable limits.  Patient denies any flushing diarrhea nausea.  She states she was discharged at that time.     Endocrine Clinic note 04/17/2023:  57-year-old female scheduled today for endocrine clinic follow-up.  History of type 2 diabetes and neuro endocrine tumor of the rectum.  Type 2 diabetes current A1c increased 7.0 from previous 6.0.  Patient states she is been eating and snacking more and her CBGS have increased at home and having multiple numbers in the 200s at times.  Patient states she is been eating more sweets and also starches.  Patient denies any hypoglycemia. History of low-grade neuroendocrine tumor of the rectum diagnosed on 05/27/2015 patient was seen at [facility].   Patient had colonoscopy 05/27/2015 which showed a rectal polyp 5 mm with biopsy showing low-grade neuroendocrine carcinoid tumor present the resection margin with largest microscopic dimension of 0.8 cm with involvement of submucosal and mucosa.  Tumor was strongly positive for synaptophysin and negative for chromogranin.  In 2015 oncology clinic ordered CT of the abdomen and pelvis, and whole-body scan and MRI of the abdomen all within normal acceptable limits.  Patient denies any flushing, diarrhea, nausea.  She states currently she is due for a 5 year colonoscopy and will be calling her gastroenterologists.     Endocrine clinic note 10/17/2023:  57-year-old female scheduled today for endocrine clinic follow-up.  History of type 2 diabetes.  Type 2 diabetes current A1c 6.9 Previous A1c 7.0.  On patient's previous visit patient was on metformin and Ozempic was added the patient's regimen.  Patient states after being started on Ozempic she stopped her metformin and currently she is only on Ozempic and currently A1c is at goal at 6.9.  Patient denies any hypoglycemia.  Foot exam is due foot exam performed today see documentation.  Patient also has not had eye exam in over a year we will complete fundus photo today.  Patient denies any side effects to her Ozempic.       Review of Systems   Constitutional:  Negative for activity change, appetite change and fatigue.   HENT:  Negative for dental problem, hearing loss, tinnitus, trouble swallowing and goiter.    Eyes:  Negative for photophobia, pain and visual disturbance.   Respiratory:  Negative for cough, chest tightness and wheezing.    Cardiovascular:  Negative for chest pain, palpitations and leg swelling.   Gastrointestinal:  Negative for abdominal pain, constipation, diarrhea, nausea and reflux.   Endocrine: Negative for cold intolerance, heat intolerance, polydipsia and polyphagia.   Genitourinary:  Negative for difficulty urinating, flank pain, hematuria, hot  flashes, menstrual irregularity, menstrual problem, nocturia and urgency.   Musculoskeletal:  Negative for back pain, gait problem, joint swelling, leg pain and joint deformity.   Integumentary:  Negative for color change, pallor, rash and breast discharge.   Allergic/Immunologic: Negative for environmental allergies, food allergies and immunocompromised state.   Neurological:  Negative for tremors, seizures, headaches, memory loss and coordination difficulties.   Psychiatric/Behavioral:  Negative for agitation, behavioral problems and sleep disturbance. The patient is not nervous/anxious.           Objective     Physical Exam  Constitutional:       General: She is not in acute distress.     Appearance: Normal appearance. She is not ill-appearing.   HENT:      Head: Normocephalic and atraumatic.      Right Ear: External ear normal.      Left Ear: External ear normal.      Nose: Nose normal. No congestion or rhinorrhea.      Mouth/Throat:      Mouth: Mucous membranes are moist.      Pharynx: Oropharynx is clear. No oropharyngeal exudate.   Eyes:      General:         Right eye: No discharge.         Left eye: No discharge.      Conjunctiva/sclera: Conjunctivae normal.      Pupils: Pupils are equal, round, and reactive to light.   Neck:      Thyroid: No thyroid mass, thyromegaly or thyroid tenderness.   Cardiovascular:      Rate and Rhythm: Normal rate and regular rhythm.      Pulses: Normal pulses.           Dorsalis pedis pulses are 2+ on the right side and 2+ on the left side.        Posterior tibial pulses are 2+ on the right side and 2+ on the left side.      Heart sounds: Normal heart sounds. No murmur heard.  Pulmonary:      Effort: Pulmonary effort is normal. No respiratory distress.      Breath sounds: Normal breath sounds.   Abdominal:      General: Abdomen is flat. Bowel sounds are normal. There is no distension.      Palpations: Abdomen is soft.      Tenderness: There is no abdominal tenderness.    Musculoskeletal:         General: No swelling or tenderness. Normal range of motion.      Cervical back: Normal range of motion and neck supple. No tenderness.      Right lower leg: No edema.      Left lower leg: No edema.   Feet:      Right foot:      Protective Sensation: 5 sites tested.  5 sites sensed.      Skin integrity: Skin integrity normal.      Toenail Condition: Right toenails are normal.      Left foot:      Protective Sensation: 5 sites tested.  5 sites sensed.      Skin integrity: Skin integrity normal.      Toenail Condition: Left toenails are normal.   Lymphadenopathy:      Cervical: No cervical adenopathy.   Skin:     General: Skin is warm and dry.      Coloration: Skin is not jaundiced or pale.   Neurological:      General: No focal deficit present.      Mental Status: She is alert and oriented to person, place, and time. Mental status is at baseline.      Coordination: Coordination normal.      Gait: Gait normal.   Psychiatric:         Mood and Affect: Mood normal.         Behavior: Behavior normal.         Thought Content: Thought content normal.            Assessment and Plan     1. Type 2 diabetes mellitus without complication, without long-term current use of insulin  Current 6.9 Previous A1C 7.0   Medications: Metformin 500 mg once a day (not taking), Ozempic 0.5 mg q week Changes: None   A1C goal <7.0   Foot Exam: 10/17/2023 Eye Exam: Fundus 10/17/2023 (could not obtain left eye unable to image)    Follow ADA diet, avoid soda, simple sweets, and limit rice, breads and starches  Do not skip meals, eat balanced ADA approved meals   Maintain a healthy weight, exercise 4-5 times a week for 30 minutes  Diet and medication adherence discussed on visit  Call Clinic to report Hypoglycemia (CBG's <90)  -     Hemoglobin A1C, POCT  -     Microalbumin/Creatinine Ratio, Urine  -     Diabetic Eye Screening Photo  -     semaglutide (OZEMPIC) 0.25 mg or 0.5 mg(2 mg/1.5 mL) pen injector; Start 0.25 mg x 4  weeks then increase to 0.5 mg weekly after  Dispense: 3 mL; Refill: 11      Follow up in about 6 months (around 4/17/2024) for Type 2 diabetes.      I spent a total of 30 minutes on the day of the visit.  This includes face to face time and non-face to face time preparing to see the patient (eg, review of tests), obtaining and/or reviewing separately obtained history, documenting clinical information in the electronic or other health record, independently interpreting results and communicating results to the patient/family/caregiver, or care coordinator.

## 2023-10-17 NOTE — PROGRESS NOTES
Informed the patient that she had elevation in her urine creatinine starting lisinopril 2.5mg to protect her kidneys. She is also needs to    Avoid NSAIDS (Advil, Ibuprofen, Naproxen, indomethacin, Aleve, Diclofenac, Celebrex and Mobic)   Drink 1/2 your body weight in ounces of water per day   Avoid Soda, Limit coffee and tea, Avoid alcohol   Follow Low sodium diet  (2 grams a day)   Control Blood pressure  ( goal BP < 130/80)   Control Diabetes (goal A1C <7.0)      Pt verbalized understanding

## 2024-04-11 ENCOUNTER — TELEPHONE (OUTPATIENT)
Dept: ENDOCRINOLOGY | Facility: CLINIC | Age: 59
End: 2024-04-11
Payer: MEDICAID

## 2024-04-11 NOTE — TELEPHONE ENCOUNTER
I called pt who stated she has been taking ozempic 0.25mg ever since she started. I informed pt she was supposed to increase to 0.5mg after 1 month of starting medication. Pt verbalizes understanding and will begin 0.5mg weekly.

## 2024-08-14 ENCOUNTER — LAB VISIT (OUTPATIENT)
Dept: LAB | Facility: HOSPITAL | Age: 59
End: 2024-08-14
Attending: NURSE PRACTITIONER
Payer: MEDICAID

## 2024-08-14 ENCOUNTER — OFFICE VISIT (OUTPATIENT)
Dept: ENDOCRINOLOGY | Facility: CLINIC | Age: 59
End: 2024-08-14
Payer: MEDICAID

## 2024-08-14 VITALS
RESPIRATION RATE: 12 BRPM | WEIGHT: 261.69 LBS | BODY MASS INDEX: 46.37 KG/M2 | HEART RATE: 69 BPM | HEIGHT: 63 IN | TEMPERATURE: 98 F | SYSTOLIC BLOOD PRESSURE: 131 MMHG | DIASTOLIC BLOOD PRESSURE: 81 MMHG

## 2024-08-14 DIAGNOSIS — N28.9 NEPHROPATHY: ICD-10-CM

## 2024-08-14 DIAGNOSIS — E11.9 TYPE 2 DIABETES MELLITUS WITHOUT COMPLICATION, WITHOUT LONG-TERM CURRENT USE OF INSULIN: Primary | ICD-10-CM

## 2024-08-14 DIAGNOSIS — E11.9 TYPE 2 DIABETES MELLITUS WITHOUT COMPLICATION, WITHOUT LONG-TERM CURRENT USE OF INSULIN: ICD-10-CM

## 2024-08-14 LAB
ALBUMIN SERPL-MCNC: 3.7 G/DL (ref 3.5–5)
ALBUMIN/GLOB SERPL: 0.9 RATIO (ref 1.1–2)
ALP SERPL-CCNC: 95 UNIT/L (ref 40–150)
ALT SERPL-CCNC: 10 UNIT/L (ref 0–55)
ANION GAP SERPL CALC-SCNC: 7 MEQ/L
AST SERPL-CCNC: 11 UNIT/L (ref 5–34)
BILIRUB SERPL-MCNC: 0.3 MG/DL
BUN SERPL-MCNC: 15.1 MG/DL (ref 9.8–20.1)
CALCIUM SERPL-MCNC: 9.8 MG/DL (ref 8.4–10.2)
CHLORIDE SERPL-SCNC: 108 MMOL/L (ref 98–107)
CHOLEST SERPL-MCNC: 178 MG/DL
CHOLEST/HDLC SERPL: 3 {RATIO} (ref 0–5)
CO2 SERPL-SCNC: 27 MMOL/L (ref 22–29)
CREAT SERPL-MCNC: 0.91 MG/DL (ref 0.55–1.02)
CREAT UR-MCNC: 181.5 MG/DL (ref 45–106)
CREAT/UREA NIT SERPL: 17
GFR SERPLBLD CREATININE-BSD FMLA CKD-EPI: >60 ML/MIN/1.73/M2
GLOBULIN SER-MCNC: 4.2 GM/DL (ref 2.4–3.5)
GLUCOSE SERPL-MCNC: 65 MG/DL (ref 74–100)
HBA1C MFR BLD: 6.5 %
HDLC SERPL-MCNC: 52 MG/DL (ref 35–60)
LDLC SERPL CALC-MCNC: 109 MG/DL (ref 50–140)
MICROALBUMIN UR-MCNC: 18 UG/ML
MICROALBUMIN/CREAT RATIO PNL UR: 9.9 MG/GM CR (ref 0–30)
POTASSIUM SERPL-SCNC: 3.9 MMOL/L (ref 3.5–5.1)
PROT SERPL-MCNC: 7.9 GM/DL (ref 6.4–8.3)
SODIUM SERPL-SCNC: 142 MMOL/L (ref 136–145)
TRIGL SERPL-MCNC: 84 MG/DL (ref 37–140)
VLDLC SERPL CALC-MCNC: 17 MG/DL

## 2024-08-14 PROCEDURE — 99214 OFFICE O/P EST MOD 30 MIN: CPT | Mod: PBBFAC | Performed by: NURSE PRACTITIONER

## 2024-08-14 PROCEDURE — 3008F BODY MASS INDEX DOCD: CPT | Mod: CPTII,,, | Performed by: NURSE PRACTITIONER

## 2024-08-14 PROCEDURE — 3075F SYST BP GE 130 - 139MM HG: CPT | Mod: CPTII,,, | Performed by: NURSE PRACTITIONER

## 2024-08-14 PROCEDURE — 1159F MED LIST DOCD IN RCRD: CPT | Mod: CPTII,,, | Performed by: NURSE PRACTITIONER

## 2024-08-14 PROCEDURE — 83036 HEMOGLOBIN GLYCOSYLATED A1C: CPT | Mod: PBBFAC | Performed by: NURSE PRACTITIONER

## 2024-08-14 PROCEDURE — 80061 LIPID PANEL: CPT

## 2024-08-14 PROCEDURE — 1160F RVW MEDS BY RX/DR IN RCRD: CPT | Mod: CPTII,,, | Performed by: NURSE PRACTITIONER

## 2024-08-14 PROCEDURE — 3044F HG A1C LEVEL LT 7.0%: CPT | Mod: CPTII,,, | Performed by: NURSE PRACTITIONER

## 2024-08-14 PROCEDURE — 80053 COMPREHEN METABOLIC PANEL: CPT

## 2024-08-14 PROCEDURE — 99214 OFFICE O/P EST MOD 30 MIN: CPT | Mod: S$PBB,,, | Performed by: NURSE PRACTITIONER

## 2024-08-14 PROCEDURE — 36415 COLL VENOUS BLD VENIPUNCTURE: CPT

## 2024-08-14 PROCEDURE — 3079F DIAST BP 80-89 MM HG: CPT | Mod: CPTII,,, | Performed by: NURSE PRACTITIONER

## 2024-08-14 PROCEDURE — 82043 UR ALBUMIN QUANTITATIVE: CPT | Performed by: NURSE PRACTITIONER

## 2024-08-14 RX ORDER — LISINOPRIL 2.5 MG/1
2.5 TABLET ORAL DAILY
Qty: 90 TABLET | Refills: 3 | Status: SHIPPED | OUTPATIENT
Start: 2024-08-14 | End: 2025-08-14

## 2024-08-14 RX ORDER — SEMAGLUTIDE 1.34 MG/ML
1 INJECTION, SOLUTION SUBCUTANEOUS
Qty: 3 ML | Refills: 5 | Status: SHIPPED | OUTPATIENT
Start: 2024-08-14 | End: 2025-08-14

## 2024-08-14 NOTE — PROGRESS NOTES
Subjective     Patient ID: Christie Jha is a 58 y.o. female.    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  Type 2 diabetes on patient's referral A1c was 13.9 patient reports the clinic today A1c improved to 6.0 from previous 13.9.  Patient states she is only on metformin a 1000 mg b.i.d. 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  Patient denies any hypoglycemia.  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  Patient had colonoscopy 05/27/2015 which showed a rectal polyp 5 mm with biopsy showing low-grade neuroendocrine carcinoid tumor present the resection margin with largest microscopic dimension of 0.8 cm with involvement of submucosal and mucosa.  Tumor was strongly positive for synaptophysin and negative for chromogranin.  In 2015 oncology clinic ordered CT of the abdomen and pelvis, and whole-body scan and MRI of the abdomen all within normal acceptable limits.  Patient denies any flushing diarrhea nausea.  She states she was discharged at that time.     Endocrine Clinic note 04/17/2023:  57-year-old female scheduled today for endocrine clinic follow-up.  History of type 2 diabetes and neuro endocrine tumor of the rectum.  Type 2 diabetes current A1c increased 7.0 from previous 6.0.  Patient states she is been eating and snacking more and her CBGS have increased at home and having multiple numbers in the 200s at times.  Patient states she is been eating more sweets and also starches.  Patient denies any hypoglycemia. 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  Patient had colonoscopy 05/27/2015 which showed a rectal polyp 5 mm with biopsy showing low-grade neuroendocrine carcinoid tumor present the resection margin with largest microscopic dimension of 0.8 cm with involvement of submucosal and mucosa.  Tumor was strongly positive for synaptophysin and negative for chromogranin.  In 2015 oncology clinic ordered CT of the abdomen and pelvis, and whole-body scan and MRI of the abdomen all within normal acceptable limits.  Patient denies any flushing, diarrhea, nausea.  She states currently she is due for a 5 year colonoscopy and will be calling her gastroenterologists.      Endocrine clinic note 10/17/2023:  57-year-old female scheduled today for endocrine clinic follow-up.  History of type 2 diabetes.  Type 2 diabetes current A1c 6.9 Previous A1c 7.0.  On patient's previous visit patient was on metformin and Ozempic was added the patient's regimen.  Patient states after being started on Ozempic she stopped her metformin and currently she is only on Ozempic and currently A1c is at goal at 6.9.  Patient denies any hypoglycemia.  Foot exam is due foot exam performed today see documentation.  Patient also has not had eye exam in over a year we will complete fundus photo today.  Patient denies any side effects to her Ozempic.     Endocrine Clinic note 08/14/2024:  58 year female scheduled today for endocrine clinic follow-up.  History of type 2 diabetes.  Type 2 diabetes current A1c 6.5.  Patient occasionally checks CBGS she states she does not like sticking herself with needles.  Patient denies any symptoms of hypoglycemia.  Nephropathy previous urine micro elevated 10/17/2023.  Patient states she did not receive her prescription lisinopril we will repeat urine micro today if remains elevated we will recent prescription for lisinopril.            Review of Systems   Constitutional:  Negative for activity change, appetite change and fatigue.   HENT:   Negative for dental problem, hearing loss, tinnitus, trouble swallowing and goiter.    Eyes:  Negative for photophobia, pain and visual disturbance.   Respiratory:  Negative for cough, chest tightness and wheezing.    Cardiovascular:  Negative for chest pain, palpitations and leg swelling.   Gastrointestinal:  Negative for abdominal pain, constipation, diarrhea, nausea and reflux.   Endocrine: Negative for cold intolerance, heat intolerance, polydipsia and polyphagia.   Genitourinary:  Negative for difficulty urinating, flank pain, hematuria, hot flashes, menstrual irregularity, menstrual problem, nocturia and urgency.   Musculoskeletal:  Negative for back pain, gait problem, joint swelling, leg pain and joint deformity.   Integumentary:  Negative for color change, pallor, rash and breast discharge.   Allergic/Immunologic: Negative for environmental allergies, food allergies and immunocompromised state.   Neurological:  Negative for tremors, seizures, headaches, memory loss and coordination difficulties.   Psychiatric/Behavioral:  Negative for agitation, behavioral problems and sleep disturbance. The patient is not nervous/anxious.           Objective     Physical Exam  Constitutional:       General: She is not in acute distress.     Appearance: Normal appearance. She is not ill-appearing.   HENT:      Head: Normocephalic and atraumatic.      Right Ear: External ear normal.      Left Ear: External ear normal.      Nose: Nose normal. No congestion or rhinorrhea.      Mouth/Throat:      Mouth: Mucous membranes are moist.      Pharynx: Oropharynx is clear. No oropharyngeal exudate.   Eyes:      General:         Right eye: No discharge.         Left eye: No discharge.      Conjunctiva/sclera: Conjunctivae normal.      Pupils: Pupils are equal, round, and reactive to light.   Neck:      Thyroid: No thyroid mass, thyromegaly or thyroid tenderness.   Cardiovascular:      Rate and Rhythm: Normal rate and regular rhythm.       Pulses: Normal pulses.      Heart sounds: Normal heart sounds. No murmur heard.  Pulmonary:      Effort: Pulmonary effort is normal. No respiratory distress.      Breath sounds: Normal breath sounds.   Abdominal:      General: Abdomen is flat. Bowel sounds are normal. There is no distension.      Palpations: Abdomen is soft.      Tenderness: There is no abdominal tenderness.   Musculoskeletal:         General: No swelling or tenderness. Normal range of motion.      Cervical back: Normal range of motion and neck supple. No tenderness.      Right lower leg: No edema.      Left lower leg: No edema.   Feet:      Right foot:      Skin integrity: Skin integrity normal.      Left foot:      Skin integrity: Skin integrity normal.   Lymphadenopathy:      Cervical: No cervical adenopathy.   Skin:     General: Skin is warm and dry.      Coloration: Skin is not jaundiced or pale.   Neurological:      General: No focal deficit present.      Mental Status: She is alert and oriented to person, place, and time. Mental status is at baseline.      Coordination: Coordination normal.      Gait: Gait normal.   Psychiatric:         Mood and Affect: Mood normal.         Behavior: Behavior normal.         Thought Content: Thought content normal.            Assessment and Plan     1. Type 2 diabetes mellitus without complication, without long-term current use of insulin  A1c 6.5  Current Medications:  Ozempic 0.5 mg q week Changes:  Increase Ozempic to 1 mg  A1C goal <7.0   Foot Exam: 10/17/2023   Eye Exam: Fundus 10/17/2023 (could not obtain left eye unable to image)           Component Ref Range & Units 10 mo ago  (10/17/23) 1 yr ago  (4/18/23) 1 yr ago  (10/14/22)   Hemoglobin A1C, POC % 6.9 7.0 6.0      -     Hemoglobin A1C, POCT  -     Comprehensive Metabolic Panel; Future; Expected date: 08/14/2024  -     Lipid Panel; Future; Expected date: 08/14/2024  -     semaglutide (OZEMPIC) 1 mg/dose (4 mg/3 mL); Inject 1 mg into the skin  every 7 days.  Dispense: 3 mL; Refill: 5    2 . Nephropathy  Urine Micro elevated 10/17/2023  Low dose ACE stated at that time          Component Ref Range & Units 10 mo ago  (10/17/23) 1 yr ago  (4/17/23) 1 yr ago  (10/14/22)   Urine Microalbumin <=30.0 ug/ml 23.6 40.4 High  10.0   Urine Creatinine 45.0 - 106.0 mg/dL 201.2 High  114.5 High  R 87.6 R   Microalbumin Creatinine Ratio 0.0 - 30.0 mg/gm Cr 11.7 35.3 High  11.4      -     Microalbumin/Creatinine Ratio, Urine  -     Comprehensive Metabolic Panel; Future          Follow up in about 6 months (around 2/14/2025) for Type 2 diabetes.

## 2024-12-19 ENCOUNTER — TELEPHONE (OUTPATIENT)
Dept: ENDOCRINOLOGY | Facility: CLINIC | Age: 59
End: 2024-12-19
Payer: MEDICAID

## 2024-12-19 DIAGNOSIS — E11.9 TYPE 2 DIABETES MELLITUS WITHOUT COMPLICATION, WITHOUT LONG-TERM CURRENT USE OF INSULIN: Primary | ICD-10-CM

## 2024-12-19 RX ORDER — SEMAGLUTIDE 2.68 MG/ML
2 INJECTION, SOLUTION SUBCUTANEOUS
Qty: 3 ML | Refills: 11 | Status: SHIPPED | OUTPATIENT
Start: 2024-12-19 | End: 2025-12-19

## 2024-12-19 NOTE — TELEPHONE ENCOUNTER
----- Message from Curry sent at 12/19/2024 10:30 AM CST -----  Patient of Becky    Patient called requesting a refill for Ozempic.    [phone]  10:31  Thanks,

## 2024-12-20 ENCOUNTER — TELEPHONE (OUTPATIENT)
Dept: ENDOCRINOLOGY | Facility: CLINIC | Age: 59
End: 2024-12-20
Payer: MEDICAID

## 2024-12-20 NOTE — TELEPHONE ENCOUNTER
Returned pt call. Pt stated that the pharmacist is asking how soon can start taking semaglutide (OZEMPIC) 2 mg/dose (8 mg/3 mL) PnIj. Please advise.

## 2024-12-20 NOTE — TELEPHONE ENCOUNTER
----- Message from Curry sent at 12/20/2024  3:08 PM CST -----  Patient of Becky    Patient called wanting to discuss if she can start the new dosage for semaglutide (OZEMPIC) 2 mg/dose (8 mg/3 mL) PnIj.      3:09  Thanks,

## 2025-01-07 ENCOUNTER — TELEPHONE (OUTPATIENT)
Dept: ENDOCRINOLOGY | Facility: CLINIC | Age: 60
End: 2025-01-07
Payer: MEDICAID

## 2025-01-07 NOTE — TELEPHONE ENCOUNTER
----- Message from Curry sent at 1/7/2025  2:38 PM CST -----  Patient of Becky    Patient called regarding prior authorization  for ozempic.    [phone]  2:38  Thanks,

## 2025-01-07 NOTE — TELEPHONE ENCOUNTER
Called [facility] pharmacy on patient behalf correct insurance information given informed will stat another PA

## 2025-02-14 ENCOUNTER — OFFICE VISIT (OUTPATIENT)
Dept: ENDOCRINOLOGY | Facility: CLINIC | Age: 60
End: 2025-02-14
Payer: MEDICAID

## 2025-02-14 VITALS
HEIGHT: 63 IN | WEIGHT: 254.88 LBS | HEART RATE: 56 BPM | DIASTOLIC BLOOD PRESSURE: 69 MMHG | BODY MASS INDEX: 45.16 KG/M2 | SYSTOLIC BLOOD PRESSURE: 122 MMHG | RESPIRATION RATE: 18 BRPM | TEMPERATURE: 98 F

## 2025-02-14 DIAGNOSIS — N28.9 NEPHROPATHY: ICD-10-CM

## 2025-02-14 DIAGNOSIS — E11.9 TYPE 2 DIABETES MELLITUS WITHOUT COMPLICATION, WITHOUT LONG-TERM CURRENT USE OF INSULIN: Primary | ICD-10-CM

## 2025-02-14 LAB
CREAT UR-MCNC: 130.6 MG/DL (ref 45–106)
HBA1C MFR BLD: 6 %
MICROALBUMIN UR-MCNC: 20.6 UG/ML
MICROALBUMIN/CREAT RATIO PNL UR: 15.8 MG/GM CR (ref 0–30)

## 2025-02-14 PROCEDURE — 82043 UR ALBUMIN QUANTITATIVE: CPT | Performed by: NURSE PRACTITIONER

## 2025-02-14 PROCEDURE — 99214 OFFICE O/P EST MOD 30 MIN: CPT | Mod: PBBFAC | Performed by: NURSE PRACTITIONER

## 2025-02-14 RX ORDER — HYDROXYZINE PAMOATE 25 MG/1
25 CAPSULE ORAL 2 TIMES DAILY PRN
COMMUNITY
Start: 2025-01-16

## 2025-02-14 RX ORDER — HYDROCHLOROTHIAZIDE 25 MG/1
25 TABLET ORAL
COMMUNITY
Start: 2025-02-09

## 2025-08-14 ENCOUNTER — OFFICE VISIT (OUTPATIENT)
Dept: ENDOCRINOLOGY | Facility: CLINIC | Age: 60
End: 2025-08-14
Payer: MEDICAID

## 2025-08-14 VITALS
HEIGHT: 63 IN | TEMPERATURE: 97 F | BODY MASS INDEX: 45.32 KG/M2 | DIASTOLIC BLOOD PRESSURE: 85 MMHG | WEIGHT: 255.75 LBS | RESPIRATION RATE: 18 BRPM | SYSTOLIC BLOOD PRESSURE: 137 MMHG | HEART RATE: 62 BPM

## 2025-08-14 DIAGNOSIS — K21.9 GASTROESOPHAGEAL REFLUX DISEASE, UNSPECIFIED WHETHER ESOPHAGITIS PRESENT: ICD-10-CM

## 2025-08-14 DIAGNOSIS — E11.9 TYPE 2 DIABETES MELLITUS WITHOUT COMPLICATION, WITHOUT LONG-TERM CURRENT USE OF INSULIN: Primary | ICD-10-CM

## 2025-08-14 DIAGNOSIS — N28.9 NEPHROPATHY: ICD-10-CM

## 2025-08-14 LAB — HBA1C MFR BLD: 6.9 %

## 2025-08-14 PROCEDURE — 3075F SYST BP GE 130 - 139MM HG: CPT | Mod: CPTII,,, | Performed by: NURSE PRACTITIONER

## 2025-08-14 PROCEDURE — 3061F NEG MICROALBUMINURIA REV: CPT | Mod: CPTII,,, | Performed by: NURSE PRACTITIONER

## 2025-08-14 PROCEDURE — 99214 OFFICE O/P EST MOD 30 MIN: CPT | Mod: S$PBB,,, | Performed by: NURSE PRACTITIONER

## 2025-08-14 PROCEDURE — 83036 HEMOGLOBIN GLYCOSYLATED A1C: CPT | Mod: PBBFAC | Performed by: NURSE PRACTITIONER

## 2025-08-14 PROCEDURE — 3044F HG A1C LEVEL LT 7.0%: CPT | Mod: CPTII,,, | Performed by: NURSE PRACTITIONER

## 2025-08-14 PROCEDURE — 99214 OFFICE O/P EST MOD 30 MIN: CPT | Mod: PBBFAC | Performed by: NURSE PRACTITIONER

## 2025-08-14 PROCEDURE — 1160F RVW MEDS BY RX/DR IN RCRD: CPT | Mod: CPTII,,, | Performed by: NURSE PRACTITIONER

## 2025-08-14 PROCEDURE — 1159F MED LIST DOCD IN RCRD: CPT | Mod: CPTII,,, | Performed by: NURSE PRACTITIONER

## 2025-08-14 PROCEDURE — 3008F BODY MASS INDEX DOCD: CPT | Mod: CPTII,,, | Performed by: NURSE PRACTITIONER

## 2025-08-14 PROCEDURE — 4010F ACE/ARB THERAPY RXD/TAKEN: CPT | Mod: CPTII,,, | Performed by: NURSE PRACTITIONER

## 2025-08-14 PROCEDURE — 3066F NEPHROPATHY DOC TX: CPT | Mod: CPTII,,, | Performed by: NURSE PRACTITIONER

## 2025-08-14 PROCEDURE — 3079F DIAST BP 80-89 MM HG: CPT | Mod: CPTII,,, | Performed by: NURSE PRACTITIONER

## 2025-08-14 RX ORDER — PANTOPRAZOLE SODIUM 40 MG/1
40 TABLET, DELAYED RELEASE ORAL DAILY
Qty: 90 TABLET | Refills: 1 | Status: SHIPPED | OUTPATIENT
Start: 2025-08-14

## 2025-08-14 RX ORDER — SEMAGLUTIDE 2.68 MG/ML
2 INJECTION, SOLUTION SUBCUTANEOUS
Qty: 3 ML | Refills: 11 | Status: SHIPPED | OUTPATIENT
Start: 2025-08-14 | End: 2026-08-14

## 2025-08-26 ENCOUNTER — TELEPHONE (OUTPATIENT)
Dept: ENDOCRINOLOGY | Facility: CLINIC | Age: 60
End: 2025-08-26
Payer: MEDICAID

## 2025-08-26 DIAGNOSIS — N28.9 NEPHROPATHY: ICD-10-CM

## 2025-08-26 RX ORDER — LISINOPRIL 2.5 MG/1
2.5 TABLET ORAL
Qty: 30 TABLET | Refills: 5 | Status: SHIPPED | OUTPATIENT
Start: 2025-08-26